# Patient Record
Sex: MALE | Race: WHITE | URBAN - METROPOLITAN AREA
[De-identification: names, ages, dates, MRNs, and addresses within clinical notes are randomized per-mention and may not be internally consistent; named-entity substitution may affect disease eponyms.]

---

## 2020-01-17 ENCOUNTER — OFFICE VISIT (OUTPATIENT)
Dept: INTERNAL MEDICINE CLINIC | Facility: CLINIC | Age: 30
End: 2020-01-17
Payer: COMMERCIAL

## 2020-01-17 ENCOUNTER — TELEPHONE (OUTPATIENT)
Dept: FAMILY MEDICINE CLINIC | Facility: CLINIC | Age: 30
End: 2020-01-17

## 2020-01-17 VITALS
DIASTOLIC BLOOD PRESSURE: 79 MMHG | HEIGHT: 62 IN | HEART RATE: 90 BPM | WEIGHT: 274 LBS | SYSTOLIC BLOOD PRESSURE: 119 MMHG | BODY MASS INDEX: 50.42 KG/M2 | TEMPERATURE: 98 F

## 2020-01-17 DIAGNOSIS — J30.9 ALLERGIC RHINITIS WITH POSTNASAL DRIP: ICD-10-CM

## 2020-01-17 DIAGNOSIS — R68.89 INFLUENZA-LIKE SYMPTOMS: Primary | ICD-10-CM

## 2020-01-17 DIAGNOSIS — R09.82 ALLERGIC RHINITIS WITH POSTNASAL DRIP: ICD-10-CM

## 2020-01-17 LAB
FLUAV + FLUBV RNA SPEC NAA+PROBE: NEGATIVE

## 2020-01-17 PROCEDURE — 99203 OFFICE O/P NEW LOW 30 MIN: CPT | Performed by: INTERNAL MEDICINE

## 2020-01-17 RX ORDER — FLUTICASONE PROPIONATE 50 MCG
SPRAY, SUSPENSION (ML) NASAL
Qty: 1 BOTTLE | Refills: 3 | Status: SHIPPED | OUTPATIENT
Start: 2020-01-17

## 2020-01-17 NOTE — TELEPHONE ENCOUNTER
Reason for Call/Symptoms: Body aches, back pain, chest pain when coughing. Onset: Yesterday  Courtesy Assessment: New patient to [place] [facility], [facility] calling with body aches, cough, back pain and chest pain due to the cough.   Level of care recommendation: Appt

## 2020-01-17 NOTE — PROGRESS NOTES
History of Present Illness   Patient ID: Saadia Felix is a 34year old male. Chief Complaint: Body ache and/or chills (also c/o  serverly bad Headache,nausea, emisis, ear discomfort. )      Flu   This is a new problem.  The current episode started y and no tenderness. Mouth/Throat: Posterior oropharyngeal erythema present. No oropharyngeal exudate. Eyes: Pupils are equal, round, and reactive to light. Conjunctivae, EOM and lids are normal. Right eye exhibits no discharge.  Left eye exhibits no disc Medications   Medication Sig Dispense Refill   • Fluticasone Propionate 50 MCG/ACT Nasal Suspension 1-2 sprays into each nostril daily as needed for 4-5 days. 1 Bottle 3          Assessment & Plan    1.  Influenza-like symptoms  - INFLUENZA A/B(FLU) AND RSV 3,000mg of tylenol in a 24 hour period, ie 1,000mg every 8 hours, for 2-3 days as needed. • Do not use tylenol if you have a liver condition and do not use NSAIDs if you have a kidney condition. • Stay hydrated with plenty of water, rest when needed.   • (rare)  How is a cold treated? Colds usually last 5 to 10 days. Treatment focuses on relieving symptoms. Treatments may include:  · Decongestant medicines. Several types of decongestants are available without prescription.  These may help reduce stuffy or may get another type of infection while you have a cold. These can include:  · Sinus infection  · Lung infection, such as bronchitis or pneumonia  · Ear infection  If you have asthma or chronic bronchitis, a cold can make your condition worse.   When should fever. It may cause severe liver or brain damage. · Your appetite may be poor, so a light diet is fine. Stay well hydrated by drinking 6 to 8 glasses of fluids per day (water, soft drinks, juices, tea, or soup).  Extra fluids will help loosen secretions in have:   · A cold. This type of illness is caused by a virus. It can cause a runny nose, stuffed-up nose, sneezing, coughing, headache, mild body aches, and low fever. A cold gets better on its own in a few days to a week. · The flu (influenza).  This is a patches on the tonsils, red spots on the roof of the mouth, fever, body aches, and nausea and vomiting. · Urinary tract infection (UTI). This is a bacterial infection of the bladder and the tube that takes urine out of the body.  It can cause burning pain warm, moist washcloth on the ear for 10 minutes at a time.

## 2020-01-17 NOTE — PATIENT INSTRUCTIONS
Your current symptoms are consistent with a viral infection at this time. Your symptoms may worsen over the next few days but should improve or resolve within the next 7- 10 days.     Some recommendations include:    • Use tylenol or NSAIDs like alev you.  · You touch your eyes, nose, or mouth when your hand has a cold virus on it. This can happen if you touch an object that has the cold virus on it. What are the symptoms of a cold virus? You may wonder if you have a cold or the flu.  Compared to the soap and water, use an alcohol-based hand . · Don’t touch your nose, eyes, or mouth, especially after touching something that may have a cold virus on it. · Cover your mouth and nose when you cough or sneeze.  Throw away tissues after using them provider. · Avoid being exposed to cigarette smoke (yours or others’). · You may use acetaminophen or ibuprofen to control pain and fever, unless another medicine was prescribed.  If you have chronic liver or kidney disease, have ever had a stomach ulcer for reasons other than a bacterial infection can cause problems. For example, you may have side effects from the medicine. And if you really need an antibiotic, it may not work well. You may have sneezing, a runny nose, itchy or watery eyes, or a sore throat. Allergies are not treated with antibiotics. · Low fever. A mild fever that’s less than 100.4°F (38°C) most likely doesn’t need treatment with antibiotics.    When antibiotics can saline, as directed by your healthcare provider. · Breathe in steam from a hot shower. · Use a humidifier or cool mist vaporizer. To quiet a cough:   · Use a humidifier or cool mist vaporizer. · Breathe in steam from a hot shower.   · Use cough lozenge

## 2020-01-17 NOTE — TELEPHONE ENCOUNTER
Patient has not been seen in any  department. Patient is NOT established with the clinic. Call Details: patient states he is having chest pain, headaches, body aches and feel weak     Call transferred to RN Triage ([id number]).

## (undated) NOTE — LETTER
1/17/2020          To Whom It May Concern:    Osvaldo London is currently under my medical care and may not return to work at this time. Please excuse Maeola Nails for 2 days. He may return to work on 1/18/20. Activity is restricted as follows: none.

## (undated) NOTE — LETTER
January 24, 2020     Mamta Phelps 44      Dear Dodson Sandifer:    Below are the results from your recent visit:    Notes recorded by Kishore Urena MD on 1/17/2020 at 3:29 PM CST  Negative for flu.  Continue with symptomat